# Patient Record
(demographics unavailable — no encounter records)

---

## 2024-11-20 NOTE — PHYSICAL EXAM
[General Appearance - Well Developed] : well developed [Normal Appearance] : normal appearance [Well Groomed] : well groomed [General Appearance - Well Nourished] : well nourished [No Deformities] : no deformities [General Appearance - In No Acute Distress] : no acute distress [Normal Conjunctiva] : the conjunctiva exhibited no abnormalities [Eyelids - No Xanthelasma] : the eyelids demonstrated no xanthelasmas [Normal Oral Mucosa] : normal oral mucosa [No Oral Pallor] : no oral pallor [No Oral Cyanosis] : no oral cyanosis [Normal Jugular Venous A Waves Present] : normal jugular venous A waves present [Normal Jugular Venous V Waves Present] : normal jugular venous V waves present [No Jugular Venous Stauffer A Waves] : no jugular venous stauffer A waves [Respiration, Rhythm And Depth] : normal respiratory rhythm and effort [Exaggerated Use Of Accessory Muscles For Inspiration] : no accessory muscle use [Auscultation Breath Sounds / Voice Sounds] : lungs were clear to auscultation bilaterally [Heart Rate And Rhythm] : heart rate and rhythm were normal [Heart Sounds] : normal S1 and S2 [Murmurs] : no murmurs present [Abdomen Soft] : soft [Abdomen Tenderness] : non-tender [Abdomen Mass (___ Cm)] : no abdominal mass palpated [Abnormal Walk] : normal gait [Gait - Sufficient For Exercise Testing] : the gait was sufficient for exercise testing [Nail Clubbing] : no clubbing of the fingernails [Cyanosis, Localized] : no localized cyanosis [Petechial Hemorrhages (___cm)] : no petechial hemorrhages [Skin Color & Pigmentation] : normal skin color and pigmentation [] : no rash [No Venous Stasis] : no venous stasis [Skin Lesions] : no skin lesions [No Skin Ulcers] : no skin ulcer [No Xanthoma] : no  xanthoma was observed [Oriented To Time, Place, And Person] : oriented to person, place, and time [Affect] : the affect was normal [Mood] : the mood was normal [No Anxiety] : not feeling anxious

## 2024-11-20 NOTE — REASON FOR VISIT
[Follow-Up - Clinic] : a clinic follow-up of [Hyperlipidemia] : hyperlipidemia [FreeTextEntry3] : [facility] Spondylolysis [FreeTextEntry1] : I saw this 71-year-old man in followup consultation on 11/20/24 He has a history of hyperlipidemia , well-controlled on medication ( Niacin) He has a family history of both parents requiring aortic valve surgery He comes in for a routine followup visit after double vessel stenting following a CTA his BP is well controlled on beta blocker. He still has some chest discomfort He is concerned about an ultrasound that showed atherosclerosis of his aorta and so I did  an ultrasound of his abdominal aorta which was normal. Echo showed a dilated aortic root

## 2024-11-20 NOTE — HISTORY OF PRESENT ILLNESS
[FreeTextEntry1] : he has  no  chest pain He has no shortness of breath He has no palpitations He has no syncope He is neurologically intact He has no edema He has no GI symptoms

## 2024-11-20 NOTE — DISCUSSION/SUMMARY
[FreeTextEntry1] : He will remain on the same medication for his lipids, and beta blocker for BP control.  I added losartan 25 mg to further lower his pressure. He will monitor his BP and contact me if it is elevated We will repeat all blood work.  He is lipid profile is excellent and he was able to reverse his LDL HDL ratio.  His A1c was 5.4. EKG was unchanged I will see him again in 6 months. [EKG obtained to assist in diagnosis and management of assessed problem(s)] : EKG obtained to assist in diagnosis and management of assessed problem(s)

## 2025-05-21 NOTE — REASON FOR VISIT
[Follow-Up - Clinic] : a clinic follow-up of [Hyperlipidemia] : hyperlipidemia [FreeTextEntry3] : [facility] [FreeTextEntry1] : I saw this 72-year-old man in followup consultation on 05/21/25 He has a history of hyperlipidemia , well-controlled on medication ( Niacin) He has a family history of both parents requiring aortic valve surgery He comes in for a routine followup visit after double vessel stenting following a CTA his BP is well controlled on beta blocker. He still has some chest discomfort He is concerned about an ultrasound that showed atherosclerosis of his aorta and so I did  an ultrasound of his abdominal aorta which was normal. Echo showed a dilated aortic root